# Patient Record
Sex: FEMALE | Race: WHITE | Employment: OTHER | URBAN - METROPOLITAN AREA
[De-identification: names, ages, dates, MRNs, and addresses within clinical notes are randomized per-mention and may not be internally consistent; named-entity substitution may affect disease eponyms.]

---

## 2019-10-09 PROBLEM — R41.82 ALTERED MENTAL STATUS: Status: ACTIVE | Noted: 2019-10-09

## 2019-10-09 PROBLEM — N39.0 UTI (URINARY TRACT INFECTION): Status: ACTIVE | Noted: 2019-10-09

## 2019-10-11 PROBLEM — E78.5 HLD (HYPERLIPIDEMIA): Status: ACTIVE | Noted: 2019-10-11

## 2019-10-11 PROBLEM — F03.90 DEMENTIA (HCC): Status: ACTIVE | Noted: 2019-10-11

## 2019-10-11 PROBLEM — N39.0 UTI (URINARY TRACT INFECTION): Status: RESOLVED | Noted: 2019-10-09 | Resolved: 2019-10-11

## 2019-10-11 PROBLEM — Z86.69 HX OF BELL'S PALSY: Status: ACTIVE | Noted: 2019-10-11

## 2019-10-11 PROBLEM — I10 ESSENTIAL HYPERTENSION: Status: ACTIVE | Noted: 2019-10-11

## 2019-10-11 PROBLEM — R09.81 SINUS CONGESTION: Status: ACTIVE | Noted: 2019-10-11

## 2019-10-11 PROBLEM — K21.9 GASTROESOPHAGEAL REFLUX DISEASE WITHOUT ESOPHAGITIS: Status: ACTIVE | Noted: 2019-10-11

## 2019-10-11 PROBLEM — R41.82 ALTERED MENTAL STATUS: Status: RESOLVED | Noted: 2019-10-09 | Resolved: 2019-10-11

## 2019-11-22 ENCOUNTER — OFFICE VISIT (OUTPATIENT)
Dept: FAMILY MEDICINE CLINIC | Age: 79
End: 2019-11-22

## 2019-11-22 ENCOUNTER — HOSPITAL ENCOUNTER (OUTPATIENT)
Dept: LAB | Age: 79
Discharge: HOME | End: 2019-11-22
Payer: MEDICARE

## 2019-11-22 VITALS
DIASTOLIC BLOOD PRESSURE: 60 MMHG | OXYGEN SATURATION: 96 % | HEART RATE: 46 BPM | HEIGHT: 61 IN | BODY MASS INDEX: 30.58 KG/M2 | SYSTOLIC BLOOD PRESSURE: 158 MMHG | WEIGHT: 162 LBS | TEMPERATURE: 96.7 F | RESPIRATION RATE: 20 BRPM

## 2019-11-22 DIAGNOSIS — I10 ESSENTIAL HYPERTENSION: Primary | ICD-10-CM

## 2019-11-22 DIAGNOSIS — E78.2 MIXED HYPERLIPIDEMIA: ICD-10-CM

## 2019-11-22 DIAGNOSIS — N30.01 ACUTE CYSTITIS WITH HEMATURIA: ICD-10-CM

## 2019-11-22 DIAGNOSIS — R35.0 URINARY FREQUENCY: ICD-10-CM

## 2019-11-22 DIAGNOSIS — K21.9 GASTROESOPHAGEAL REFLUX DISEASE WITHOUT ESOPHAGITIS: ICD-10-CM

## 2019-11-22 LAB
BILIRUB UR QL STRIP: NEGATIVE
GLUCOSE UR-MCNC: NEGATIVE MG/DL
KETONES P FAST UR STRIP-MCNC: NEGATIVE MG/DL
PH UR STRIP: 5 [PH] (ref 4.6–8)
PROT UR QL STRIP: NEGATIVE
SP GR UR STRIP: 1.02 (ref 1–1.03)
UA UROBILINOGEN AMB POC: NORMAL (ref 0.2–1)
URINALYSIS CLARITY POC: CLEAR
URINALYSIS COLOR POC: YELLOW
URINE BLOOD POC: NORMAL
URINE LEUKOCYTES POC: NORMAL
URINE NITRITES POC: NEGATIVE

## 2019-11-22 PROCEDURE — 87086 URINE CULTURE/COLONY COUNT: CPT

## 2019-11-22 PROCEDURE — 87077 CULTURE AEROBIC IDENTIFY: CPT

## 2019-11-22 PROCEDURE — 87186 SC STD MICRODIL/AGAR DIL: CPT

## 2019-11-22 RX ORDER — TROLAMINE SALICYLATE 10 G/100G
CREAM TOPICAL AS NEEDED
COMMUNITY
End: 2021-03-09

## 2019-11-22 RX ORDER — HYDROGEN PEROXIDE 3 %
SOLUTION, NON-ORAL MISCELLANEOUS DAILY
COMMUNITY

## 2019-11-22 RX ORDER — ERGOCALCIFEROL 1.25 MG/1
50000 CAPSULE ORAL
COMMUNITY

## 2019-11-22 RX ORDER — GLUCOSAM/CHONDRO/HERB 149/HYAL 750-100 MG
1 TABLET ORAL DAILY
COMMUNITY

## 2019-11-22 RX ORDER — ROSUVASTATIN CALCIUM 20 MG/1
20 TABLET, COATED ORAL
COMMUNITY

## 2019-11-22 RX ORDER — HYDROCHLOROTHIAZIDE 12.5 MG/1
12.5 CAPSULE ORAL DAILY
COMMUNITY
End: 2021-03-09

## 2019-11-22 RX ORDER — CIPROFLOXACIN 500 MG/1
500 TABLET ORAL 2 TIMES DAILY
Qty: 14 TAB | Refills: 0 | Status: SHIPPED | OUTPATIENT
Start: 2019-11-22 | End: 2019-11-29

## 2019-11-22 RX ORDER — ACETAMINOPHEN 325 MG/1
TABLET ORAL
COMMUNITY

## 2019-11-22 NOTE — PROGRESS NOTES
Jordon Melvin is a 78 y.o. female (: 1940) presenting to address:    Chief Complaint   Patient presents with    Establish Care    Hypertension    Cholesterol Problem       Vitals:    19 1436   BP: 160/58   Pulse: (!) 46   Resp: 20   Temp: 96.7 °F (35.9 °C)   TempSrc: Oral   SpO2: 96%   Weight: 162 lb (73.5 kg)   Height: 5' 0.83\" (1.545 m)   PainSc:   0 - No pain       Hearing/Vision:   No exam data present    Learning Assessment:     Learning Assessment 2019   PRIMARY LEARNER Patient   HIGHEST LEVEL OF EDUCATION - PRIMARY LEARNER  SOME COLLEGE   BARRIERS PRIMARY LEARNER OTHER   CO-LEARNER CAREGIVER Yes   CO-LEARNER NAME assistant living   PRIMARY LANGUAGE ENGLISH   LEARNER PREFERENCE PRIMARY OTHER (COMMENT)   ANSWERED BY patient daughter   RELATIONSHIP OTHER     Depression Screening:     3 most recent PHQ Screens 2019   Little interest or pleasure in doing things Not at all   Feeling down, depressed, irritable, or hopeless Not at all   Total Score PHQ 2 0     Fall Risk Assessment:     Fall Risk Assessment, last 12 mths 2019   Able to walk? Yes   Fall in past 12 months? No     Abuse Screening:   No flowsheet data found. Coordination of Care Questionaire:   1. Have you been to the ER, urgent care clinic since your last visit? Hospitalized since your last visit? NO    2. Have you seen or consulted any other health care providers outside of the [address] [place] [address] since your last visit? Include any pap smears or colon screening. NO    Advanced Directive:   1. Do you have an Advanced Directive? NO    2. Would you like information on Advanced Directives?  NO

## 2019-11-24 LAB
BACTERIA SPEC CULT: ABNORMAL
SERVICE CMNT-IMP: ABNORMAL

## 2019-12-11 NOTE — PATIENT INSTRUCTIONS
Urinary Tract Infection in Women: Care Instructions Your Care Instructions A urinary tract infection, or UTI, is a general term for an infection anywhere between the kidneys and the urethra (where urine comes out). Most UTIs are bladder infections. They often cause pain or burning when you urinate. UTIs are caused by bacteria and can be cured with antibiotics. Be sure to complete your treatment so that the infection goes away. Follow-up care is a key part of your treatment and safety. Be sure to make and go to all appointments, and call your doctor if you are having problems. It's also a good idea to know your test results and keep a list of the medicines you take. How can you care for yourself at home? · Take your antibiotics as directed. Do not stop taking them just because you feel better. You need to take the full course of antibiotics. · Drink extra water and other fluids for the next day or two. This may help wash out the bacteria that are causing the infection. (If you have kidney, heart, or liver disease and have to limit fluids, talk with your doctor before you increase your fluid intake.) · Avoid drinks that are carbonated or have caffeine. They can irritate the bladder. · Urinate often. Try to empty your bladder each time. · To relieve pain, take a hot bath or lay a heating pad set on low over your lower belly or genital area. Never go to sleep with a heating pad in place. To prevent UTIs · Drink plenty of water each day. This helps you urinate often, which clears bacteria from your system. (If you have kidney, heart, or liver disease and have to limit fluids, talk with your doctor before you increase your fluid intake.) · Urinate when you need to. · Urinate right after you have sex. · Change sanitary pads often. · Avoid douches, bubble baths, feminine hygiene sprays, and other feminine hygiene products that have deodorants. · After going to the bathroom, wipe from front to back. When should you call for help? Call your doctor now or seek immediate medical care if: 
  · Symptoms such as fever, chills, nausea, or vomiting get worse or appear for the first time.  
  · You have new pain in your back just below your rib cage. This is called flank pain.  
  · There is new blood or pus in your urine.  
  · You have any problems with your antibiotic medicine.  
 Watch closely for changes in your health, and be sure to contact your doctor if: 
  · You are not getting better after taking an antibiotic for 2 days.  
  · Your symptoms go away but then come back. Where can you learn more? Go to http://isela-erik.info/. Enter T480 in the search box to learn more about \"Urinary Tract Infection in Women: Care Instructions. \" Current as of: December 19, 2018 Content Version: 12.2 © 5395-9033 [employer]. Care instructions adapted under license by [employer] (which disclaims liability or warranty for this information). If you have questions about a medical condition or this instruction, always ask your healthcare professional. [address] any warranty or liability for your use of this information.

## 2019-12-11 NOTE — PROGRESS NOTES
Aston Tran is a 78 y.o.  female and presents with    Chief Complaint   Patient presents with    Establish Care    Hypertension    Cholesterol Problem    Dementia         Subjective:  Patient presents to establish care. Patient with several comorbidities to include hypertension, hyper lipids, GERD. Other concerns today patient with history of recurrent UTIs currently with symptoms of urinary frequency x1 week. Patient denies fever, chills, abdominal pain. Current Outpatient Medications   Medication Sig Dispense Refill    esomeprazole (NEXIUM) 20 mg capsule Take  by mouth daily.  omega 3-DHA-EPA-fish oil 1,000 mg (120 mg-180 mg) capsule Take 1 Cap by mouth daily.  hydroCHLOROthiazide (MICROZIDE) 12.5 mg capsule Take 12.5 mg by mouth daily.  OTHER       rosuvastatin (CRESTOR) 20 mg tablet Take 20 mg by mouth nightly.  ergocalciferol (VITAMIN D2) 50,000 unit capsule Take 50,000 Units by mouth.  trolamine salicylate (MYOFLEX) 10 % topical cream Apply  to affected area as needed.  acetaminophen (TYLENOL) 325 mg tablet Take  by mouth every four (4) hours as needed for Pain.  loratadine 10 mg cap Take  by mouth. No Known Allergies  Past Medical History:   Diagnosis Date    Dementia ([facility] [address]     GERD (gastroesophageal reflux disease)     Hypercholesterolemia     Hypertension      History reviewed. No pertinent surgical history. Family History   Problem Relation Age of Onset    Cancer Mother     Cancer Father      Social History     Tobacco Use    Smoking status: Never Smoker    Smokeless tobacco: Never Used   Substance Use Topics    Alcohol use: Not Currently       Review of Systems   Constitutional: Negative for chills and fever. HENT: Negative. Eyes: Negative for blurred vision. Respiratory: Negative for shortness of breath. Cardiovascular: Negative for chest pain and leg swelling.    Gastrointestinal: Negative for abdominal pain, constipation, diarrhea, nausea and vomiting. Genitourinary: Positive for frequency. Musculoskeletal: Negative. Skin: Negative. Neurological: Negative for headaches. Objective:  Vitals:    11/22/19 1436 11/22/19 1512   BP: 160/58 158/60   Pulse: (!) 46    Resp: 20    Temp: 96.7 °F (35.9 °C)    TempSrc: Oral    SpO2: 96%    Weight: 162 lb (73.5 kg)    Height: 5' 0.83\" (1.545 m)    PainSc:   0 - No pain      General:   Well-groomed, well-nourished, in no distress, pleasant, alert, appropriate    Cardiovasc:   RRR,  no murmur, rubs or gallops. Pulmonary:    Lungs clear bilaterally, no wheezing, rales or rhonchi. Abdomen:   Abdomen soft, normal bowel sounds. No masses, tenderness,    rebound/rigidity or CVA tenderness. No hepatosplenomegaly. Urine: abnormals charted    Assessment/Plan:      1. Essential hypertension  Stable condition mildly elevated BPs today however home BPs stated to be within normal limits will continue to monitor continue same medication for now no change    2. Gastroesophageal reflux disease without esophagitis  Continue same medication for now no change    3. Mixed hyperlipidemia  Continue same medication for now no change    4. Urinary frequency  - AMB POC URINALYSIS DIP STICK AUTO W/O MICRO  - CULTURE, URINE; Future    5. Acute cystitis with hematuria  Abnormal urine will culture for now Cipro return to care as needed water. I have discussed the diagnosis with the patient and the intended plan as seen in the above orders. The patient has received an after-visit summary and questions were answered concerning future plans. I have discussed medication side effects and warnings with the patient as well. I have reviewed the plan of care with the patient, accepted their input and they are in agreement with the treatment goals. Follow-up and Dispositions    · Return in about 3 months (around 2/22/2020).        More than 1/2 of this 30 minute visit was spent face to face in counselling and coordination of care, as described above. This document may have been created with the aid of dictation software. Text may contain errors, particularly phonetic errors.      Nae TAPIA-BC

## 2021-03-04 PROBLEM — R77.8 ELEVATED TROPONIN LEVEL: Status: ACTIVE | Noted: 2021-03-04

## 2021-03-04 PROBLEM — N17.9 ACUTE RENAL FAILURE (ARF) (HCC): Status: ACTIVE | Noted: 2021-03-04

## 2021-03-04 PROBLEM — E83.41 HYPERMAGNESEMIA: Status: ACTIVE | Noted: 2021-03-04

## 2021-08-03 PROBLEM — I10 ESSENTIAL HYPERTENSION: Status: RESOLVED | Noted: 2019-10-11 | Resolved: 2021-08-03

## 2022-03-18 PROBLEM — E83.41 HYPERMAGNESEMIA: Status: ACTIVE | Noted: 2021-03-04

## 2022-03-18 PROBLEM — E78.5 HLD (HYPERLIPIDEMIA): Status: ACTIVE | Noted: 2019-10-11

## 2022-03-19 PROBLEM — N17.9 ACUTE RENAL FAILURE (ARF) (HCC): Status: ACTIVE | Noted: 2021-03-04

## 2022-03-19 PROBLEM — I10 ESSENTIAL HYPERTENSION: Status: ACTIVE | Noted: 2019-11-22

## 2022-03-19 PROBLEM — K21.9 GASTROESOPHAGEAL REFLUX DISEASE WITHOUT ESOPHAGITIS: Status: ACTIVE | Noted: 2019-11-22

## 2022-03-19 PROBLEM — F03.90 DEMENTIA (HCC): Status: ACTIVE | Noted: 2019-10-11

## 2022-03-19 PROBLEM — R77.8 ELEVATED TROPONIN LEVEL: Status: ACTIVE | Noted: 2021-03-04

## 2022-03-20 PROBLEM — Z86.69 HX OF BELL'S PALSY: Status: ACTIVE | Noted: 2019-10-11

## 2022-03-20 PROBLEM — R09.81 SINUS CONGESTION: Status: ACTIVE | Noted: 2019-10-11

## 2022-03-20 PROBLEM — K21.9 GASTROESOPHAGEAL REFLUX DISEASE WITHOUT ESOPHAGITIS: Status: ACTIVE | Noted: 2019-10-11

## 2022-03-20 PROBLEM — E78.2 MIXED HYPERLIPIDEMIA: Status: ACTIVE | Noted: 2019-11-22

## 2023-01-31 RX ORDER — LOPERAMIDE HYDROCHLORIDE 2 MG/1
2 CAPSULE ORAL
COMMUNITY

## 2023-01-31 RX ORDER — POLYETHYLENE GLYCOL 3350 17 G/17G
17 POWDER, FOR SOLUTION ORAL DAILY
COMMUNITY

## 2023-01-31 RX ORDER — TROLAMINE SALICYLATE 10 G/100G
1 CREAM TOPICAL 2 TIMES DAILY PRN
COMMUNITY
Start: 2019-10-11

## 2023-01-31 RX ORDER — LORATADINE 10 MG/1
10 TABLET ORAL DAILY PRN
COMMUNITY
Start: 2019-10-11

## 2023-01-31 RX ORDER — ERGOCALCIFEROL 1.25 MG/1
50000 CAPSULE ORAL
COMMUNITY

## 2023-01-31 RX ORDER — ROSUVASTATIN CALCIUM 20 MG/1
20 TABLET, COATED ORAL
COMMUNITY

## 2023-01-31 RX ORDER — ACETAMINOPHEN 325 MG/1
TABLET ORAL EVERY 4 HOURS PRN
COMMUNITY

## 2023-01-31 RX ORDER — LANOLIN ALCOHOL/MO/W.PET/CERES
3 CREAM (GRAM) TOPICAL
COMMUNITY